# Patient Record
Sex: FEMALE | Race: WHITE | NOT HISPANIC OR LATINO | Employment: UNEMPLOYED | URBAN - NONMETROPOLITAN AREA
[De-identification: names, ages, dates, MRNs, and addresses within clinical notes are randomized per-mention and may not be internally consistent; named-entity substitution may affect disease eponyms.]

---

## 2022-03-30 ENCOUNTER — TELEPHONE (OUTPATIENT)
Dept: SURGERY | Facility: CLINIC | Age: 47
End: 2022-03-30

## 2022-07-05 RX ORDER — LISINOPRIL 5 MG/1
5 TABLET ORAL DAILY
COMMUNITY

## 2022-07-06 ENCOUNTER — OFFICE VISIT (OUTPATIENT)
Dept: SURGERY | Facility: CLINIC | Age: 47
End: 2022-07-06

## 2022-07-06 VITALS
WEIGHT: 238.2 LBS | HEART RATE: 73 BPM | SYSTOLIC BLOOD PRESSURE: 122 MMHG | HEIGHT: 67 IN | BODY MASS INDEX: 37.39 KG/M2 | DIASTOLIC BLOOD PRESSURE: 84 MMHG

## 2022-07-06 DIAGNOSIS — E66.01 CLASS 2 SEVERE OBESITY DUE TO EXCESS CALORIES WITH SERIOUS COMORBIDITY AND BODY MASS INDEX (BMI) OF 37.0 TO 37.9 IN ADULT: Primary | ICD-10-CM

## 2022-07-06 PROCEDURE — 99205 OFFICE O/P NEW HI 60 MIN: CPT | Performed by: SURGERY

## 2022-07-06 RX ORDER — SODIUM CHLORIDE 0.9 % (FLUSH) 0.9 %
10 SYRINGE (ML) INJECTION EVERY 12 HOURS SCHEDULED
Status: CANCELLED | OUTPATIENT
Start: 2022-08-02

## 2022-07-06 RX ORDER — GABAPENTIN 300 MG/1
300 CAPSULE ORAL 2 TIMES DAILY
COMMUNITY
Start: 2022-06-06

## 2022-07-06 RX ORDER — HYDROCHLOROTHIAZIDE 12.5 MG/1
TABLET ORAL
COMMUNITY
Start: 2022-07-05

## 2022-07-06 RX ORDER — SODIUM CHLORIDE 0.9 % (FLUSH) 0.9 %
10 SYRINGE (ML) INJECTION AS NEEDED
Status: CANCELLED | OUTPATIENT
Start: 2022-08-02

## 2022-07-06 RX ORDER — ASPIRIN 81 MG/1
81 TABLET, COATED ORAL DAILY
COMMUNITY
Start: 2022-06-06

## 2022-07-06 RX ORDER — METOPROLOL SUCCINATE 50 MG/1
50 TABLET, EXTENDED RELEASE ORAL DAILY
COMMUNITY
Start: 2022-06-06

## 2022-07-06 RX ORDER — ATORVASTATIN CALCIUM 20 MG/1
20 TABLET, FILM COATED ORAL
COMMUNITY
Start: 2022-06-06

## 2022-07-06 NOTE — PROGRESS NOTES
Date of Service: 7/6/2022    Subjective   Jaz Zavala is a 47 y.o. female is being seen for consultation for   obesity today at the request of Yamilet Reid APRN    Chief complaint: Weight gain    Jaz Zavala is a 47 y.o. obese female who presents to my clinic with interest in sleeve gastrectomy.  The patient reports having issues with weight gain after her third child.  She is tried multiple diets in the past including low-carb, low-fat, [facility], calorie counting, Slim fast.  She also walked much more in the past and was able to sustain a small amount of weight loss for about a year before gaining it back.  She noticed worsening hip and foot pain at work    General diet: The patient works the entire day and states that she normally does not eat until she gets home late at night.  She will then eat a large meal.  She has been trying to drink 0-calorie beverages throughout the day while she is working.    Exercise: Intermittent walking but this is less than she had been doing in the past due to her new foot and hip pain    The patient reports being told that she had a hiatal hernia in the past.  This is asymptomatic.  She does not have acid reflux, nor she on acid suppression medicine.  No dysphagia        07/06/22  1057   Weight: 108 kg (238 lb 3.2 oz)     67 inches  Body mass index is 37.87 kg/m².    Ideal Body Weight: 136 pounds  Excess Body Weight (Weight-IBW): 102 pounds    Past Medical History:   Diagnosis Date   • Ankle swelling    • Diabetes mellitus (HCC)    • Elevated triglycerides with high cholesterol    • Heart burn    • Hemorrhoids    • Hiatal hernia    • High cholesterol    • Hypertension    • Umbilical hernia        Past Surgical History:   Procedure Laterality Date   • COLONOSCOPY     • ENDOSCOPY     • HYSTERECTOMY  2012   • TONSILLECTOMY  1986         Family History   Problem Relation Age of Onset   • Diabetes Mother    • Heart disease Father    • Alzheimer's disease Maternal  "Grandmother    • Diabetes Maternal Grandfather    • Hypertension Paternal Grandmother    • Heart disease Paternal Grandmother    • Stroke Paternal Grandfather          Social History     Socioeconomic History   • Marital status:    Tobacco Use   • Smoking status: Never Smoker   • Smokeless tobacco: Never Used   Vaping Use   • Vaping Use: Never used   Substance and Sexual Activity   • Alcohol use: Not Currently   • Drug use: Never   • Sexual activity: Defer                Review of Systems        Constitutional: No fevers, chills or malaise.  Unintentional weight gain   eyes: Denies visual changes    Cardiovascular: Denies chest pain, palpitations.  Mild ankle swelling   Respiratory: Denies cough or shortness of breath   Abdominal/Gastrointestinal: No abdominal pain, nausea or vomiting   Genitourinary: Denies dysuria or hematuria   Musculoskeletal: Bilateral foot pain and hip pain              Skin: No lesions or rashes   Psychiatric: No recent mood changes   Neurologic: No paresthesias or loss of function        Objective     Physical Exam:      07/06/22  1057   Weight: 108 kg (238 lb 3.2 oz)    Body mass index is 37.87 kg/m².  Constitution: /84   Pulse 73   Ht 168.9 cm (66.5\")   Wt 108 kg (238 lb 3.2 oz)   BMI 37.87 kg/m²  . No acute distress.  obese  Head: Normocephalic, atraumatic.   Eyes: Aligned without strabismus. Conjunctiva noninjected   Ears, Nose, Mouth:  No lesions appreciated   CV: Rhythm  and rate regular   Respiratory: Symmetric chest expansion. No respiratory distress.   Gastrointestinal:  soft, nontender, nondistende  Skin:  No cyanosis, clubbing or edema bilaterally    Lymphatics: No abnormal cervical or supraclavicular adenopathy appreciated   Neurologic: No gross deficits   Psychiatric: alert and oriented x3             Assessment     Jaz Zavala is a 47 y.o. morbidly obese (Body mass index is 37.87 kg/m².) female with diabetes, hyperlipidemia s and hypertension    We had an " extensive discussion regarding the risks of sleeve gastrectomy, manage expectations, and the preoperative work-up.    The patient will be referred to nutrition to discuss dietary modifications, as I will expect the patient to lose 10% (10 pounds) of their excess body weight on their own prior to being offered sleeve gastrectomy.  This will help the patient establish positive dietary habits that will help them in the long run and maximize the patient's weight loss post surgery.    The patient will be referred to psychiatry to better understand the patient's motives for weight loss, coping mechanisms, and possible contributing factors to the patient's morbid  obesity    In the meantime, I will obtain an upper GI study to rule out hiatal hernia which may make the patient a poor surgical candidate for sleeve gastrectomy if there is a large hiatal hernia, as well as upper endoscopy.  The patient does report a possible history of a hiatal hernia.  We discussed how, depending on size, can make her a poor candidate for sleeve gastrectomy.  Small hiatal hernias can be repaired at the time of operation.         Alex Suarez MD  [facility]

## 2022-07-07 PROBLEM — E66.812 CLASS 2 SEVERE OBESITY DUE TO EXCESS CALORIES WITH SERIOUS COMORBIDITY AND BODY MASS INDEX (BMI) OF 37.0 TO 37.9 IN ADULT: Status: ACTIVE | Noted: 2022-07-07

## 2022-07-07 PROBLEM — E66.01 CLASS 2 SEVERE OBESITY DUE TO EXCESS CALORIES WITH SERIOUS COMORBIDITY AND BODY MASS INDEX (BMI) OF 37.0 TO 37.9 IN ADULT: Status: ACTIVE | Noted: 2022-07-07

## 2022-07-18 ENCOUNTER — APPOINTMENT (OUTPATIENT)
Dept: GENERAL RADIOLOGY | Facility: HOSPITAL | Age: 47
End: 2022-07-18

## 2022-07-26 ENCOUNTER — OFFICE VISIT (OUTPATIENT)
Dept: PSYCHIATRY | Facility: CLINIC | Age: 47
End: 2022-07-26

## 2022-07-26 DIAGNOSIS — Z71.89 ENCOUNTER FOR PSYCHOLOGICAL ASSESSMENT PRIOR TO BARIATRIC SURGERY: ICD-10-CM

## 2022-07-26 DIAGNOSIS — F33.1 MAJOR DEPRESSIVE DISORDER, RECURRENT EPISODE, MODERATE: Primary | ICD-10-CM

## 2022-07-26 DIAGNOSIS — F41.1 GENERALIZED ANXIETY DISORDER: ICD-10-CM

## 2022-07-26 DIAGNOSIS — Z86.59 H/O ANOREXIA NERVOSA: ICD-10-CM

## 2022-07-26 PROCEDURE — 90791 PSYCH DIAGNOSTIC EVALUATION: CPT | Performed by: COUNSELOR

## 2022-07-26 RX ORDER — VENLAFAXINE 75 MG/1
TABLET ORAL
COMMUNITY

## 2022-07-26 RX ORDER — AZELASTINE 1 MG/ML
SPRAY, METERED NASAL EVERY 12 HOURS SCHEDULED
COMMUNITY
Start: 2022-03-17

## 2022-07-26 RX ORDER — LEVOCETIRIZINE DIHYDROCHLORIDE 5 MG/1
5 TABLET, FILM COATED ORAL DAILY
COMMUNITY
Start: 2022-07-16

## 2022-07-26 RX ORDER — FLUTICASONE PROPIONATE 50 MCG
SPRAY, SUSPENSION (ML) NASAL EVERY 24 HOURS
COMMUNITY
Start: 2022-03-17

## 2022-07-28 ENCOUNTER — TELEPHONE (OUTPATIENT)
Dept: SURGERY | Facility: CLINIC | Age: 47
End: 2022-07-28

## 2022-07-28 NOTE — TELEPHONE ENCOUNTER
PATIENT IS AWARE TO ARRIVE AT THE HOSPITAL ON AUG 2 @ 9AM FOR PROCEDURE AND TO GET COVID TEST ON July 29.

## 2022-07-29 ENCOUNTER — HOSPITAL ENCOUNTER (OUTPATIENT)
Dept: GENERAL RADIOLOGY | Facility: HOSPITAL | Age: 47
Discharge: HOME | End: 2022-07-29

## 2022-07-29 ENCOUNTER — PATIENT ROUNDING (BHMG ONLY) (OUTPATIENT)
Dept: PSYCHIATRY | Facility: CLINIC | Age: 47
End: 2022-07-29

## 2022-07-29 ENCOUNTER — LAB (OUTPATIENT)
Dept: LAB | Facility: HOSPITAL | Age: 47
End: 2022-07-29

## 2022-07-29 DIAGNOSIS — E66.01 CLASS 2 SEVERE OBESITY DUE TO EXCESS CALORIES WITH SERIOUS COMORBIDITY AND BODY MASS INDEX (BMI) OF 37.0 TO 37.9 IN ADULT: ICD-10-CM

## 2022-07-29 LAB — SARS-COV-2 RNA PNL SPEC NAA+PROBE: NOT DETECTED

## 2022-07-29 PROCEDURE — C9803 HOPD COVID-19 SPEC COLLECT: HCPCS

## 2022-07-29 PROCEDURE — 74246 X-RAY XM UPR GI TRC 2CNTRST: CPT | Performed by: RADIOLOGY

## 2022-07-29 PROCEDURE — U0004 COV-19 TEST NON-CDC HGH THRU: HCPCS

## 2022-07-29 PROCEDURE — 74246 X-RAY XM UPR GI TRC 2CNTRST: CPT

## 2022-07-29 NOTE — PROGRESS NOTES
July 29, 2022    Hello, may I speak with Jaz Zavala?    My name is LIDYA      I am  with [facility]  [address] [place] [address]  [place] [address] [phone].    Before we get started may I verify your date of birth? 1975    I am calling to officially welcome you to our practice and ask about your recent visit. Is this a good time to talk? YES    Tell me about your visit with us. What things went well?  EVERYTHING       We're always looking for ways to make our patients' experiences even better. Do you have recommendations on ways we may improve?  NO    Overall were you satisfied with your first visit to our practice? YES       I appreciate you taking the time to speak with me today. Is there anything else I can do for you? NO      Thank you, and have a great day.

## 2022-08-02 ENCOUNTER — ANESTHESIA (OUTPATIENT)
Dept: PERIOP | Facility: HOSPITAL | Age: 47
End: 2022-08-02

## 2022-08-02 ENCOUNTER — ANESTHESIA EVENT (OUTPATIENT)
Dept: PERIOP | Facility: HOSPITAL | Age: 47
End: 2022-08-02

## 2022-08-02 ENCOUNTER — HOSPITAL ENCOUNTER (OUTPATIENT)
Facility: HOSPITAL | Age: 47
Setting detail: HOSPITAL OUTPATIENT SURGERY
Discharge: HOME | End: 2022-08-02
Attending: SURGERY | Admitting: SURGERY

## 2022-08-02 VITALS
HEART RATE: 62 BPM | RESPIRATION RATE: 18 BRPM | TEMPERATURE: 97 F | HEIGHT: 67 IN | SYSTOLIC BLOOD PRESSURE: 109 MMHG | WEIGHT: 219 LBS | BODY MASS INDEX: 34.37 KG/M2 | OXYGEN SATURATION: 97 % | DIASTOLIC BLOOD PRESSURE: 68 MMHG

## 2022-08-02 DIAGNOSIS — E66.01 CLASS 2 SEVERE OBESITY DUE TO EXCESS CALORIES WITH SERIOUS COMORBIDITY AND BODY MASS INDEX (BMI) OF 37.0 TO 37.9 IN ADULT: ICD-10-CM

## 2022-08-02 PROCEDURE — 25010000002 PROPOFOL 10 MG/ML EMULSION: Performed by: NURSE ANESTHETIST, CERTIFIED REGISTERED

## 2022-08-02 RX ORDER — IPRATROPIUM BROMIDE AND ALBUTEROL SULFATE 2.5; .5 MG/3ML; MG/3ML
3 SOLUTION RESPIRATORY (INHALATION) ONCE AS NEEDED
Status: DISCONTINUED | OUTPATIENT
Start: 2022-08-02 | End: 2022-08-02 | Stop reason: HOSPADM

## 2022-08-02 RX ORDER — MEPERIDINE HYDROCHLORIDE 25 MG/ML
12.5 INJECTION INTRAMUSCULAR; INTRAVENOUS; SUBCUTANEOUS
Status: DISCONTINUED | OUTPATIENT
Start: 2022-08-02 | End: 2022-08-02 | Stop reason: HOSPADM

## 2022-08-02 RX ORDER — MIDAZOLAM HYDROCHLORIDE 1 MG/ML
1 INJECTION INTRAMUSCULAR; INTRAVENOUS
Status: DISCONTINUED | OUTPATIENT
Start: 2022-08-02 | End: 2022-08-02 | Stop reason: HOSPADM

## 2022-08-02 RX ORDER — SODIUM CHLORIDE, SODIUM LACTATE, POTASSIUM CHLORIDE, CALCIUM CHLORIDE 600; 310; 30; 20 MG/100ML; MG/100ML; MG/100ML; MG/100ML
125 INJECTION, SOLUTION INTRAVENOUS ONCE
Status: DISCONTINUED | OUTPATIENT
Start: 2022-08-02 | End: 2022-08-02 | Stop reason: HOSPADM

## 2022-08-02 RX ORDER — SODIUM CHLORIDE, SODIUM LACTATE, POTASSIUM CHLORIDE, CALCIUM CHLORIDE 600; 310; 30; 20 MG/100ML; MG/100ML; MG/100ML; MG/100ML
100 INJECTION, SOLUTION INTRAVENOUS ONCE AS NEEDED
Status: DISCONTINUED | OUTPATIENT
Start: 2022-08-02 | End: 2022-08-02 | Stop reason: HOSPADM

## 2022-08-02 RX ORDER — FENTANYL CITRATE 50 UG/ML
50 INJECTION, SOLUTION INTRAMUSCULAR; INTRAVENOUS
Status: DISCONTINUED | OUTPATIENT
Start: 2022-08-02 | End: 2022-08-02 | Stop reason: HOSPADM

## 2022-08-02 RX ORDER — PROPOFOL 10 MG/ML
VIAL (ML) INTRAVENOUS AS NEEDED
Status: DISCONTINUED | OUTPATIENT
Start: 2022-08-02 | End: 2022-08-02 | Stop reason: SURG

## 2022-08-02 RX ORDER — ONDANSETRON 2 MG/ML
4 INJECTION INTRAMUSCULAR; INTRAVENOUS AS NEEDED
Status: DISCONTINUED | OUTPATIENT
Start: 2022-08-02 | End: 2022-08-02 | Stop reason: HOSPADM

## 2022-08-02 RX ORDER — SODIUM CHLORIDE 0.9 % (FLUSH) 0.9 %
10 SYRINGE (ML) INJECTION EVERY 12 HOURS SCHEDULED
Status: DISCONTINUED | OUTPATIENT
Start: 2022-08-02 | End: 2022-08-02 | Stop reason: HOSPADM

## 2022-08-02 RX ORDER — SODIUM CHLORIDE 0.9 % (FLUSH) 0.9 %
10 SYRINGE (ML) INJECTION AS NEEDED
Status: DISCONTINUED | OUTPATIENT
Start: 2022-08-02 | End: 2022-08-02 | Stop reason: HOSPADM

## 2022-08-02 RX ORDER — SODIUM CHLORIDE, SODIUM LACTATE, POTASSIUM CHLORIDE, CALCIUM CHLORIDE 600; 310; 30; 20 MG/100ML; MG/100ML; MG/100ML; MG/100ML
INJECTION, SOLUTION INTRAVENOUS CONTINUOUS PRN
Status: DISCONTINUED | OUTPATIENT
Start: 2022-08-02 | End: 2022-08-02 | Stop reason: SURG

## 2022-08-02 RX ORDER — OXYCODONE HYDROCHLORIDE AND ACETAMINOPHEN 5; 325 MG/1; MG/1
1 TABLET ORAL ONCE AS NEEDED
Status: DISCONTINUED | OUTPATIENT
Start: 2022-08-02 | End: 2022-08-02 | Stop reason: HOSPADM

## 2022-08-02 RX ORDER — KETOROLAC TROMETHAMINE 30 MG/ML
30 INJECTION, SOLUTION INTRAMUSCULAR; INTRAVENOUS EVERY 6 HOURS PRN
Status: DISCONTINUED | OUTPATIENT
Start: 2022-08-02 | End: 2022-08-02 | Stop reason: HOSPADM

## 2022-08-02 RX ADMIN — PROPOFOL 50 MG: 10 INJECTION, EMULSION INTRAVENOUS at 10:07

## 2022-08-02 RX ADMIN — PROPOFOL 160 MCG/KG/MIN: 10 INJECTION, EMULSION INTRAVENOUS at 10:07

## 2022-08-02 RX ADMIN — SODIUM CHLORIDE, POTASSIUM CHLORIDE, SODIUM LACTATE AND CALCIUM CHLORIDE: 600; 310; 30; 20 INJECTION, SOLUTION INTRAVENOUS at 10:05

## 2022-08-02 NOTE — INTERVAL H&P NOTE
H&P reviewed.  The patient was examined and there are no changes to the H&P.  States she is down to around 218 pounds.  She is in watching her portion sizes  Upper GI demonstrated a small intermittent sliding hiatal hernia.  To endoscopy for EGD 
No

## 2022-08-02 NOTE — ANESTHESIA PREPROCEDURE EVALUATION
Anesthesia Evaluation     Patient summary reviewed and Nursing notes reviewed   no history of anesthetic complications:  NPO Solid Status: > 8 hours  NPO Liquid Status: > 8 hours           Airway   Dental          Pulmonary - negative pulmonary ROS    breath sounds clear to auscultation  Cardiovascular   Exercise tolerance: good (4-7 METS)    Rhythm: regular  Rate: normal    (+) hypertension, hyperlipidemia,       Neuro/Psych  (+) seizures (20 years ago), psychiatric history Depression,    GI/Hepatic/Renal/Endo    (+) obesity, morbid obesity, hiatal hernia,  diabetes mellitus,     Musculoskeletal (-) negative ROS    Abdominal   (+) obese,     Abdomen: soft.   Substance History - negative use     OB/GYN negative ob/gyn ROS         Other - negative ROS                       Anesthesia Plan    ASA 3     general     intravenous induction     Anesthetic plan, risks, benefits, and alternatives have been provided, discussed and informed consent has been obtained with: patient.    Use of blood products discussed with consented to blood products.   Plan discussed with CRNA.        CODE STATUS:

## 2022-08-03 LAB — REF LAB TEST METHOD: NORMAL

## 2022-08-10 ENCOUNTER — OFFICE VISIT (OUTPATIENT)
Dept: SURGERY | Facility: CLINIC | Age: 47
End: 2022-08-10

## 2022-08-10 VITALS — HEIGHT: 67 IN | WEIGHT: 226.6 LBS | BODY MASS INDEX: 35.56 KG/M2

## 2022-08-10 DIAGNOSIS — E66.01 CLASS 2 SEVERE OBESITY DUE TO EXCESS CALORIES WITH SERIOUS COMORBIDITY AND BODY MASS INDEX (BMI) OF 37.0 TO 37.9 IN ADULT: Primary | ICD-10-CM

## 2022-08-10 PROCEDURE — 99213 OFFICE O/P EST LOW 20 MIN: CPT

## 2022-08-10 NOTE — PROGRESS NOTES
"Subjective   Jaz Zavala is a 47 y.o. female who presents today for Follow Up    Chief Complaint:    Chief Complaint   Patient presents with   • Class 2 severe obesity due to excess calories with serious         History of Present Illness:    History of Present Illness Jaz is a 47-year-old female who presents for bariatric follow-up visit.  She reports that she has seen psychiatry and is going to follow-up with them in the near future.  She does report that she has not heard about her dietary consultation yet.  Patient has had her UGI and her EGD.  She reports that she has been watching her carbohydrate intake.  She also reports that she walks \"every now and then\".  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  He denies any acid reflux or dysphagia.  Patient's last reported weight in epic is 219 pounds.  Patient's weight today is noted to be 226 pounds 9.6 ounces.  I discussed this with patient who reports that during her last visit she was not weighed and communicated that her weight was 219 pounds per her scale at home.  However, she has realized her scale at home is inaccurate.  The weight documented in the patient's past encounter after 219 pounds will be voided.    The following portions of the patient's history were reviewed and updated as appropriate: allergies, current medications, past family history, past medical history, past social history, past surgical history and problem list.    Past Medical History:  Past Medical History:   Diagnosis Date   • Ankle swelling    • Anxiety    • Chronic pain disorder    • Depression    • Diabetes mellitus (HCC)    • Elevated triglycerides with high cholesterol    • Heart burn    • Hemorrhoids    • Hiatal hernia    • High cholesterol    • Hypertension    • Panic disorder    • Seizures (HCC)    • Umbilical hernia        Social History:  Social History     Socioeconomic History   • Marital status:    Tobacco Use   • Smoking status: Never Smoker   • Smokeless tobacco: " Never Used   Vaping Use   • Vaping Use: Never used   Substance and Sexual Activity   • Alcohol use: Not Currently   • Drug use: Never   • Sexual activity: Defer       Family History:  Family History   Problem Relation Age of Onset   • Anxiety disorder Mother    • Depression Mother    • Diabetes Mother    • Alcohol abuse Father    • Heart disease Father    • Post-traumatic stress disorder Father    • Diabetes Maternal Grandfather    • Alzheimer's disease Maternal Grandmother    • Stroke Paternal Grandfather    • Hypertension Paternal Grandmother    • Heart disease Paternal Grandmother        Past Surgical History:  Past Surgical History:   Procedure Laterality Date   • COLONOSCOPY     • ENDOSCOPY     • ENDOSCOPY N/A 8/2/2022    Procedure: ESOPHAGOGASTRODUODENOSCOPY WITH ANESTHESIA;  Surgeon: Alex Suarez MD;  Location: [facility];  Service: Gastroenterology;  Laterality: N/A;   • HYSTERECTOMY  2012   • TONSILLECTOMY  1986       Problem List:  Patient Active Problem List   Diagnosis   • Class 2 severe obesity due to excess calories with serious comorbidity and body mass index (BMI) of 37.0 to 37.9 in adult (HCC)       Allergy:   No Known Allergies     Current Medications:   Current Outpatient Medications   Medication Sig Dispense Refill   • Aspirin Low Dose 81 MG EC tablet Take 81 mg by mouth Daily.     • atorvastatin (LIPITOR) 20 MG tablet Take 20 mg by mouth every night at bedtime.     • azelastine (ASTELIN) 0.1 % nasal spray Every 12 (Twelve) Hours.     • Calcium Carbonate-Vitamin D3 600-400 MG-UNIT tablet      • fluticasone (FLONASE) 50 MCG/ACT nasal spray Daily.     • gabapentin (NEURONTIN) 300 MG capsule Take 300 mg by mouth 2 (Two) Times a Day.     • hydroCHLOROthiazide (HYDRODIURIL) 12.5 MG tablet      • levocetirizine (XYZAL) 5 MG tablet Take 5 mg by mouth Daily.     • lisinopril (PRINIVIL,ZESTRIL) 5 MG tablet Take 5 mg by mouth Daily.     • metFORMIN (GLUCOPHAGE) 1000 MG tablet Take 1,000 mg by mouth 2  "(Two) Times a Day With Meals.     • metoprolol succinate XL (TOPROL-XL) 50 MG 24 hr tablet Take 50 mg by mouth Daily.     • venlafaxine (EFFEXOR) 75 MG tablet        No current facility-administered medications for this visit.       Review of Systems:    Review of Systems   Constitutional: Negative for activity change.   HENT: Negative for congestion.    Eyes: Negative for blurred vision.   Respiratory: Negative for shortness of breath.    Cardiovascular: Negative for chest pain.   Gastrointestinal: Negative for abdominal pain.   Endocrine: Negative for cold intolerance.   Genitourinary: Negative for flank pain.   Musculoskeletal: Negative for arthralgias.   Skin: Negative for bruise.   Allergic/Immunologic: Negative for environmental allergies.   Neurological: Negative for confusion.   Hematological: Negative for adenopathy.   Psychiatric/Behavioral: Negative for agitation.         Physical Exam:   Physical Exam  Constitutional:       Appearance: Normal appearance. She is obese.   HENT:      Head: Normocephalic and atraumatic.      Right Ear: External ear normal.      Left Ear: External ear normal.   Eyes:      Extraocular Movements: Extraocular movements intact.      Pupils: Pupils are equal, round, and reactive to light.   Cardiovascular:      Rate and Rhythm: Normal rate.      Pulses: Normal pulses.   Pulmonary:      Effort: Pulmonary effort is normal.   Abdominal:      General: Abdomen is flat.      Palpations: Abdomen is soft.   Musculoskeletal:         General: Normal range of motion.      Cervical back: Normal range of motion and neck supple.   Skin:     General: Skin is warm and dry.      Capillary Refill: Capillary refill takes less than 2 seconds.   Neurological:      General: No focal deficit present.      Mental Status: She is alert and oriented to person, place, and time.   Psychiatric:         Mood and Affect: Mood normal.         Behavior: Behavior normal.         Vitals:  Height 170.2 cm (67.01\"), " weight 103 kg (226 lb 9.6 oz).   Body mass index is 35.48 kg/m².   Weight during first encounter: 238 lb 3.2 ounces  Current weight loss: 11.6 pounds      Lab Results:   Admission on 2022, Discharged on 2022   Component Date Value Ref Range Status   • Reference Lab Report 2022    Final                    Value:[facility]  [address] [place] [address]  Phone: [phone] or [phone]  Fax: [phone]  Niels Cabrera M.D., Medical Director    PATIENT NAME                                     LABORATORY NO.  [id number]   DEVIN BAUTISTA                                   [id number]  [id number]                                 AGE                    SEX   SSN              CLIENT REF #  [facility]                    47        1975      F     [id number]      [id number]    SPECIALISTS                                REQUESTING M.D.           ATTENDING M.DMeg         COPY TO.  [address] [place] [address]                           DATE COLLECTED            DATE RECEIVED          DATE REPORTED  2022    DIAGNOSIS:  A.     ANTRUM, BIOPSY:  Mild chronic gastritis  No intestinal metaplasia or dysplasia identified  No Helicobacter pylori-like                           organisms identified on routine histologic sections  B.     STOMACH, BODY, POLYP:  Benign fundic gland polyp with minimal chronic inflammation  No intestinal metaplasia or dysplasia identified  No Helicobacter pylori-like organisms identified on routine histologic sections    CAM    CLINICAL HISTORY:  Class 2 severe obesity due to excess calories with serious comorbidity and body  mass index (BMI) of 37.0 to 37.9 in adult    SPECIMENS RECEIVED:  A.    ANTRUM, BIOPSY  B.    STOMACH, BODY, POLYP    MICROSCOPIC DESCRIPTION:  Tissue blocks are prepared and slides are examined microscopically on  "all  specimens. See diagnosis for details.    Professional interpretation rendered by Yana Valle M.D., DEANDRE at  [facility], [address] [place] [address].    GROSS DESCRIPTION:  A.    The specimen is received in 1 formalin filled container labeled \"antrum  biopsy\" and consists of 1 piece of tan soft tissue measuring 0.3 x 0.3 x 0.2  cm.  The specimen is submitted entirely in 1                           cassette.  RAGINI  B.    The specimen is received in 1 formalin filled container labeled \"gastric  body polyp\" and consists of 1 piece of tan soft tissue measuring 0.4 x 0.3 x  0.3 cm.  The specimen is submitted entirely in 1 cassette.    REVIEWED, DIAGNOSED AND ELECTRONICALLY  SIGNED BY:    Yana Valle M.D., DEANDRE  CPT CODES:  88305x2     Lab on 07/29/2022   Component Date Value Ref Range Status   • COVID19 07/29/2022 Not Detected  Not Detected - Ref. Range Final       Imaging:   FL Upper GI Double Contrast  Narrative: FL UPPER GI DOUBLE-CONTRAST-     REASON FOR EXAM:  gastric sleeve workup; E66.01-Morbid (severe) obesity  due to excess calories; Z68.37-Body mass index (BMI) 37.0-37.9, adult     Comparison:None.     1.2 minutes of fluoroscopic time was utilized and 13 total images  obtained.     FINDINGS: The initial  film of the abdomen was unremarkable. The  patient was able to swallow the barium mixture without difficulty. There  were no abnormal intrinsic or extrinsic mass impressions on the  esophagus. No mucosal abnormalities were seen. There is a small less  than 2 cm size intermittent sliding-type hiatal hernia. It was  associated with distal gastroesophageal reflux. The stomach was normal  in size and shape and emptied promptly into the duodenal bulb. The  duodenal bulb was normal in contour. There is a approximately 2 cm sized  diverticulum in the descending duodenal C-loop near the ampulla. The  mucosa of the proximal jejunum was otherwise unremarkable.     Impression: 1. " Small intermittent sliding hiatal hernia with minimal distal  gastroesophageal reflux.  2. Duodenal diverticulum in the descending portion of the duodenal  C-loop.     This report was finalized on 7/29/2022 10:16 AM by Dr. Zeus Luke II, MD.         Assessment & Plan   Diagnoses and all orders for this visit:    1. Class 2 severe obesity due to excess calories with serious comorbidity and body mass index (BMI) of 37.0 to 37.9 in adult ([facility]) (Primary)    Jaz is a 47-year-old morbidly obese female.  She has met her weight loss goal of 10 pounds of excess ideal body weight.  Patient will continue to follow with psychiatry to obtain clearance.  She will continue to wait her dietary consultation.  I have discussed UGI and EGD with patient.  I have also discussed her pathology with her in office today.  Patient will follow-up in 4 weeks.    Visit Diagnoses:    ICD-10-CM ICD-9-CM   1. Class 2 severe obesity due to excess calories with serious comorbidity and body mass index (BMI) of 37.0 to 37.9 in adult ([facility])  E66.01 278.01    Z68.37 V85.37         MEDS ORDERED DURING VISIT:  No orders of the defined types were placed in this encounter.      No follow-ups on file.             This document has been electronically signed by GEORGE Tovar  August 10, 2022 10:59 EDT    Please note that portions of this note were completed with a voice recognition program.

## 2022-08-11 ENCOUNTER — PATIENT ROUNDING (BHMG ONLY) (OUTPATIENT)
Dept: SURGERY | Facility: CLINIC | Age: 47
End: 2022-08-11

## 2022-08-11 NOTE — PROGRESS NOTES
August 11, 2022    Hello, may I speak with Jaz Zavala?    My name is rick    I am  with [facility]  [address] [facility] [place], [place] [address] [phone].    Before we get started may I verify your date of birth? 1975    I am calling to officially welcome you to our practice and ask about your recent visit. Is this a good time to talk? yes    Tell me about your visit with us. What things went well?  everyone was so nice and my questions was answered       We're always looking for ways to make our patients' experiences even better. Do you have recommendations on ways we may improve?  no my visit was great.    Overall were you satisfied with your first visit to our practice? yes       I appreciate you taking the time to speak with me today. Is there anything else I can do for you? no      Thank you, and have a great day.

## 2022-09-15 ENCOUNTER — TELEPHONE (OUTPATIENT)
Dept: SURGERY | Facility: CLINIC | Age: 47
End: 2022-09-15

## 2022-09-15 NOTE — TELEPHONE ENCOUNTER
Patient's   is having medical problems and needs to put her surgery on hold for now.she will call back when she wants to restart her surgery steps.

## 2024-07-23 ENCOUNTER — TRANSCRIBE ORDERS (OUTPATIENT)
Dept: ADMINISTRATIVE | Facility: HOSPITAL | Age: 49
End: 2024-07-23
Payer: COMMERCIAL

## 2024-07-23 DIAGNOSIS — Z13.820 OSTEOPOROSIS SCREENING: Primary | ICD-10-CM

## 2024-08-02 ENCOUNTER — TELEPHONE (OUTPATIENT)
Dept: GASTROENTEROLOGY | Facility: CLINIC | Age: 49
End: 2024-08-02
Payer: COMMERCIAL

## 2024-08-05 DIAGNOSIS — Z12.11 ENCOUNTER FOR SCREENING FOR MALIGNANT NEOPLASM OF COLON: Primary | ICD-10-CM

## 2024-08-05 RX ORDER — BISACODYL 5 MG/1
20 TABLET, DELAYED RELEASE ORAL ONCE
Qty: 4 TABLET | Refills: 0 | Status: SHIPPED | OUTPATIENT
Start: 2024-08-05 | End: 2024-08-05

## 2024-08-05 RX ORDER — POLYETHYLENE GLYCOL 3350 17 G/17G
510 POWDER, FOR SOLUTION ORAL ONCE
Qty: 510 G | Refills: 0 | Status: SHIPPED | OUTPATIENT
Start: 2024-08-05 | End: 2024-08-05

## 2024-08-12 ENCOUNTER — HOSPITAL ENCOUNTER (OUTPATIENT)
Dept: BONE DENSITY | Facility: HOSPITAL | Age: 49
Discharge: HOME | End: 2024-08-12
Admitting: NURSE PRACTITIONER
Payer: COMMERCIAL

## 2024-08-12 DIAGNOSIS — Z13.820 OSTEOPOROSIS SCREENING: ICD-10-CM

## 2024-08-12 PROCEDURE — 77080 DXA BONE DENSITY AXIAL: CPT

## 2024-08-12 PROCEDURE — 77080 DXA BONE DENSITY AXIAL: CPT | Performed by: RADIOLOGY

## 2024-09-27 ENCOUNTER — TELEPHONE (OUTPATIENT)
Dept: GASTROENTEROLOGY | Facility: CLINIC | Age: 49
End: 2024-09-27

## 2024-09-27 NOTE — TELEPHONE ENCOUNTER
Hub staff attempted to follow warm transfer process and was unsuccessful     Caller: Jaz Zavala    Relationship to patient: Self    Best call back number: [phone]     Patient is needing: PT IS NEEDING TO RESCHEDULE PROCEDURE ON 10/1/24. PLEASE CALL BACK AND ADVISE

## 2024-10-09 ENCOUNTER — TRANSCRIBE ORDERS (OUTPATIENT)
Dept: ADMINISTRATIVE | Facility: HOSPITAL | Age: 49
End: 2024-10-09
Payer: COMMERCIAL

## 2024-10-09 DIAGNOSIS — Z12.31 VISIT FOR SCREENING MAMMOGRAM: Primary | ICD-10-CM

## 2024-12-02 ENCOUNTER — TELEPHONE (OUTPATIENT)
Dept: GASTROENTEROLOGY | Facility: CLINIC | Age: 49
End: 2024-12-02

## (undated) DEVICE — Device: Brand: DEFENDO AIR/WATER/SUCTION AND BIOPSY VALVE

## (undated) DEVICE — Device

## (undated) DEVICE — SINGLE PORT MANIFOLD: Brand: NEPTUNE 2

## (undated) DEVICE — TUBING, SUCTION, 1/4" X 20', STRAIGHT: Brand: MEDLINE INDUSTRIES, INC.

## (undated) DEVICE — THE BITE BLOCK MAXI, LATEX FREE STRAP IS USED TO PROTECT THE ENDOSCOPE INSERTION TUBE FROM BEING BITTEN BY THE PATIENT.

## (undated) DEVICE — FRCP BX RADJAW4 NDL 2.8 240CM LG OG BX40